# Patient Record
Sex: FEMALE
[De-identification: names, ages, dates, MRNs, and addresses within clinical notes are randomized per-mention and may not be internally consistent; named-entity substitution may affect disease eponyms.]

---

## 2019-04-02 PROBLEM — Z00.00 ENCOUNTER FOR PREVENTIVE HEALTH EXAMINATION: Status: ACTIVE | Noted: 2019-04-02

## 2019-04-30 ENCOUNTER — APPOINTMENT (OUTPATIENT)
Dept: HEPATOLOGY | Facility: CLINIC | Age: 44
End: 2019-04-30
Payer: COMMERCIAL

## 2019-04-30 VITALS
SYSTOLIC BLOOD PRESSURE: 136 MMHG | BODY MASS INDEX: 37.67 KG/M2 | DIASTOLIC BLOOD PRESSURE: 89 MMHG | HEIGHT: 67 IN | HEART RATE: 78 BPM | TEMPERATURE: 98.4 F | WEIGHT: 240 LBS

## 2019-04-30 DIAGNOSIS — Z78.9 OTHER SPECIFIED HEALTH STATUS: ICD-10-CM

## 2019-04-30 PROCEDURE — 99204 OFFICE O/P NEW MOD 45 MIN: CPT

## 2019-04-30 RX ORDER — PNV NO.95/FERROUS FUM/FOLIC AC 28MG-0.8MG
TABLET ORAL
Refills: 0 | Status: ACTIVE | COMMUNITY

## 2019-04-30 RX ORDER — MULTIVITAMIN
TABLET ORAL
Refills: 0 | Status: ACTIVE | COMMUNITY

## 2019-04-30 NOTE — PHYSICAL EXAM
[General Appearance - Alert] : alert [General Appearance - In No Acute Distress] : in no acute distress [Sclera] : the sclera and conjunctiva were normal [PERRL With Normal Accommodation] : pupils were equal in size, round, and reactive to light [Extraocular Movements] : extraocular movements were intact [Outer Ear] : the ears and nose were normal in appearance [Oropharynx] : the oropharynx was normal [Neck Appearance] : the appearance of the neck was normal [Neck Cervical Mass (___cm)] : no neck mass was observed [Jugular Venous Distention Increased] : there was no jugular-venous distention [Thyroid Diffuse Enlargement] : the thyroid was not enlarged [Thyroid Nodule] : there were no palpable thyroid nodules [Auscultation Breath Sounds / Voice Sounds] : lungs were clear to auscultation bilaterally [Heart Rate And Rhythm] : heart rate was normal and rhythm regular [Heart Sounds] : normal S1 and S2 [Heart Sounds Gallop] : no gallops [Murmurs] : no murmurs [Heart Sounds Pericardial Friction Rub] : no pericardial rub [Full Pulse] : the pedal pulses are present [Edema] : there was no peripheral edema [Breast Appearance] : normal in appearance [Breast Palpation Mass] : no palpable masses [Bowel Sounds] : normal bowel sounds [Abdomen Soft] : soft [Abdomen Tenderness] : non-tender [Abdomen Mass (___ Cm)] : no abdominal mass palpated [Urinary Bladder Findings] : the bladder was normal on palpation [Cervical Lymph Nodes Enlarged Posterior Bilaterally] : posterior cervical [Cervical Lymph Nodes Enlarged Anterior Bilaterally] : anterior cervical [Supraclavicular Lymph Nodes Enlarged Bilaterally] : supraclavicular [Axillary Lymph Nodes Enlarged Bilaterally] : axillary [Femoral Lymph Nodes Enlarged Bilaterally] : femoral [Inguinal Lymph Nodes Enlarged Bilaterally] : inguinal [No CVA Tenderness] : no ~M costovertebral angle tenderness [No Spinal Tenderness] : no spinal tenderness [Abnormal Walk] : normal gait [Nail Clubbing] : no clubbing  or cyanosis of the fingernails [Musculoskeletal - Swelling] : no joint swelling seen [Motor Tone] : muscle strength and tone were normal [Skin Color & Pigmentation] : normal skin color and pigmentation [Skin Turgor] : normal skin turgor [] : no rash [Oriented To Time, Place, And Person] : oriented to person, place, and time [Impaired Insight] : insight and judgment were intact [Affect] : the affect was normal [Spider Angioma] : No spider angioma(s) were observed [Hepatojugular Reflux] : patient did not have a sustained hepatojugular reflux [Ascites Shifting Dullness] : no shifting dullness of ascites [Abdominal  Ascites] : no ascites [Liver Palpable ___ Finger Breadths Below Costal Margin] : was not palpable below costal margin [Palmar Erythema] : no Palmar Erythema [Asterixis] : no asterixis observed [Jaundice] : No jaundice [Depression] : no depression

## 2019-04-30 NOTE — HISTORY OF PRESENT ILLNESS
[Fatigue] : denies fatigue [Malaise] : denies malaise [Fever] : denies fever [Diffuse Joint Pain (Arthralgias)] : arthralgias stable [Muscle Aches, Generalized (Myalgias)] : denies myalgias [Yellow Skin Or Eyes (Jaundice)] : denies jaundice [Abdominal Pain] : denies abdominal pain [Urine Tests Nonspecific Abnormal Findings Biliuria] : denies dark urine [Light Colored Bowel Movement (Acholic Stools)] : denies pale stools [Insomnia] : denies insomnia [Skin: Rash] : denies rash [Itching (Pruritus)] : denies pruritus [Shortness Of Breath] : denies shortness of breath [Wt Gain ___ Lbs] : recent [unfilled] ~Upound(s) weight gain [Needlestick Exposure] : no needlestick exposure [Infected Sexual Partner] : no infected sexual partner [IV Drug Use] : no IV drug use [Hemodialysis] : no hemodialysis [Body Piercing] : no body piercing [Tattoo] : no tattoos [Transplant before 1992] : no transplant before 1992 [Transfusion before 1992] : no transfusion before 1992 [Incarceration] : no incarceration [Household Contact to HBV] : no household contact to HBV [Alcohol Abuse] : no alcohol abuse [Autoimmune Disorder] : no autoimmune disorder [Travel to Endemic Area] : no travel to an endemic area [Cocaine Use] : no cocaine use [Occupational Exposure] : no occupational exposure [Wt Loss ___ Lbs] : no recent weight loss [de-identified] : Ms. Ranadll Rodriguez is a 44 year old woman with several hepatic hemangiomas seen on MRI 12/3/18 up to 4.6 cm, and five small lesions suggestive of hemangiomas, but too small to characterize fully. \par A CT from 7/23/18 mentioned in the MRI report showed liver lesions, but the report is not available.\par Weight: 240 lbs, BMI 37.6, gained 15 lbs in 4-5 months until 4/2019. Alcohol: occasionally, never heavy. [de-identified] : romain

## 2019-04-30 NOTE — ASSESSMENT
[FreeTextEntry1] : Ms. Randall Rodriguez is a 44 year old woman with hepatic hemangiomas up to 4.6 cm seen on MRI 12/2018 (scanned), and 5 lesions too small to characterize. Obese with BMI 31, but MRI not suggestive of NAFLD.\par \par I explained that hemangiomas are benign liver tumors and that the other ones are likely hemangiomas as well. The MRI report does not mention growth between the CT in 7/2018 and 12/2019, and she has no known risk factors for chronic liver disease other than obesity. Hemangiomas can grow and cause abdominal dyscomfort or pain, particluarly if they are > 5 cm, but do not need follow-up if they are smaller than that and asymptomatic.\par \par Plan:\par - LFTs, hepatitis markers, AFP\par - repeat MRI after 6 months, i.e. in June to re-evaluate the small liver lesions\par - return in 2 months, after the MRI

## 2019-05-01 LAB
AFP-TM SERPL-MCNC: 1.9 NG/ML
ALBUMIN SERPL ELPH-MCNC: 3.8 G/DL
ALP BLD-CCNC: 91 U/L
ALT SERPL-CCNC: 10 U/L
ANION GAP SERPL CALC-SCNC: 13 MMOL/L
AST SERPL-CCNC: 13 U/L
BASOPHILS # BLD AUTO: 0.04 K/UL
BASOPHILS NFR BLD AUTO: 0.8 %
BILIRUB SERPL-MCNC: <0.2 MG/DL
BUN SERPL-MCNC: 10 MG/DL
CALCIUM SERPL-MCNC: 9.1 MG/DL
CHLORIDE SERPL-SCNC: 107 MMOL/L
CHOLEST SERPL-MCNC: 181 MG/DL
CHOLEST/HDLC SERPL: 2.8 RATIO
CO2 SERPL-SCNC: 25 MMOL/L
CREAT SERPL-MCNC: 0.81 MG/DL
EOSINOPHIL # BLD AUTO: 0.12 K/UL
EOSINOPHIL NFR BLD AUTO: 2.3 %
GLUCOSE SERPL-MCNC: 98 MG/DL
HBV CORE IGG+IGM SER QL: NONREACTIVE
HBV SURFACE AB SER QL: NONREACTIVE
HBV SURFACE AG SER QL: NONREACTIVE
HCT VFR BLD CALC: 36 %
HCV AB SER QL: NONREACTIVE
HCV S/CO RATIO: 0.06 S/CO
HDLC SERPL-MCNC: 64 MG/DL
HEPATITIS A IGG ANTIBODY: NONREACTIVE
HGB BLD-MCNC: 11.4 G/DL
IMM GRANULOCYTES NFR BLD AUTO: 0.2 %
LDLC SERPL CALC-MCNC: 99 MG/DL
LYMPHOCYTES # BLD AUTO: 1.9 K/UL
LYMPHOCYTES NFR BLD AUTO: 36.9 %
MAN DIFF?: NORMAL
MCHC RBC-ENTMCNC: 28.9 PG
MCHC RBC-ENTMCNC: 31.7 GM/DL
MCV RBC AUTO: 91.1 FL
MONOCYTES # BLD AUTO: 0.36 K/UL
MONOCYTES NFR BLD AUTO: 7 %
NEUTROPHILS # BLD AUTO: 2.72 K/UL
NEUTROPHILS NFR BLD AUTO: 52.8 %
PLATELET # BLD AUTO: 404 K/UL
POTASSIUM SERPL-SCNC: 4 MMOL/L
PROT SERPL-MCNC: 6.7 G/DL
RBC # BLD: 3.95 M/UL
RBC # FLD: 12.2 %
SODIUM SERPL-SCNC: 145 MMOL/L
TRIGL SERPL-MCNC: 89 MG/DL
WBC # FLD AUTO: 5.15 K/UL

## 2019-05-24 ENCOUNTER — EMERGENCY (EMERGENCY)
Facility: HOSPITAL | Age: 44
LOS: 0 days | Discharge: HOME | End: 2019-05-24
Attending: EMERGENCY MEDICINE | Admitting: SURGERY
Payer: COMMERCIAL

## 2019-05-24 VITALS
RESPIRATION RATE: 16 BRPM | TEMPERATURE: 98 F | WEIGHT: 229.06 LBS | HEIGHT: 67 IN | OXYGEN SATURATION: 100 % | HEART RATE: 78 BPM | DIASTOLIC BLOOD PRESSURE: 76 MMHG | SYSTOLIC BLOOD PRESSURE: 117 MMHG

## 2019-05-24 VITALS
SYSTOLIC BLOOD PRESSURE: 133 MMHG | TEMPERATURE: 98 F | RESPIRATION RATE: 17 BRPM | DIASTOLIC BLOOD PRESSURE: 94 MMHG | OXYGEN SATURATION: 99 % | HEART RATE: 70 BPM

## 2019-05-24 DIAGNOSIS — K29.70 GASTRITIS, UNSPECIFIED, WITHOUT BLEEDING: ICD-10-CM

## 2019-05-24 DIAGNOSIS — K80.20 CALCULUS OF GALLBLADDER WITHOUT CHOLECYSTITIS WITHOUT OBSTRUCTION: ICD-10-CM

## 2019-05-24 DIAGNOSIS — D18.00 HEMANGIOMA UNSPECIFIED SITE: ICD-10-CM

## 2019-05-24 DIAGNOSIS — R10.13 EPIGASTRIC PAIN: ICD-10-CM

## 2019-05-24 DIAGNOSIS — R11.0 NAUSEA: ICD-10-CM

## 2019-05-24 DIAGNOSIS — K80.50 CALCULUS OF BILE DUCT WITHOUT CHOLANGITIS OR CHOLECYSTITIS WITHOUT OBSTRUCTION: ICD-10-CM

## 2019-05-24 DIAGNOSIS — D18.03 HEMANGIOMA OF INTRA-ABDOMINAL STRUCTURES: ICD-10-CM

## 2019-05-24 LAB
ALBUMIN SERPL ELPH-MCNC: 3.2 G/DL — LOW (ref 3.3–5)
ALP SERPL-CCNC: 71 U/L — SIGNIFICANT CHANGE UP (ref 40–120)
ALT FLD-CCNC: 14 U/L — SIGNIFICANT CHANGE UP (ref 12–78)
ANION GAP SERPL CALC-SCNC: 7 MMOL/L — SIGNIFICANT CHANGE UP (ref 5–17)
AST SERPL-CCNC: 11 U/L — LOW (ref 15–37)
BASOPHILS # BLD AUTO: 0.05 K/UL — SIGNIFICANT CHANGE UP (ref 0–0.2)
BASOPHILS NFR BLD AUTO: 1.3 % — SIGNIFICANT CHANGE UP (ref 0–2)
BILIRUB DIRECT SERPL-MCNC: 0.09 MG/DL — SIGNIFICANT CHANGE UP (ref 0.05–0.2)
BILIRUB SERPL-MCNC: 0.4 MG/DL — SIGNIFICANT CHANGE UP (ref 0.2–1.2)
BUN SERPL-MCNC: 8 MG/DL — SIGNIFICANT CHANGE UP (ref 7–23)
CALCIUM SERPL-MCNC: 8.9 MG/DL — SIGNIFICANT CHANGE UP (ref 8.5–10.1)
CHLORIDE SERPL-SCNC: 108 MMOL/L — SIGNIFICANT CHANGE UP (ref 96–108)
CO2 SERPL-SCNC: 26 MMOL/L — SIGNIFICANT CHANGE UP (ref 22–31)
CREAT SERPL-MCNC: 0.75 MG/DL — SIGNIFICANT CHANGE UP (ref 0.5–1.3)
EOSINOPHIL # BLD AUTO: 0.14 K/UL — SIGNIFICANT CHANGE UP (ref 0–0.5)
EOSINOPHIL NFR BLD AUTO: 3.5 % — SIGNIFICANT CHANGE UP (ref 0–6)
GLUCOSE SERPL-MCNC: 88 MG/DL — SIGNIFICANT CHANGE UP (ref 70–99)
HCG SERPL-ACNC: <1 MIU/ML — SIGNIFICANT CHANGE UP
HCT VFR BLD CALC: 34.6 % — SIGNIFICANT CHANGE UP (ref 34.5–45)
HGB BLD-MCNC: 11.8 G/DL — SIGNIFICANT CHANGE UP (ref 11.5–15.5)
IMM GRANULOCYTES NFR BLD AUTO: 0.3 % — SIGNIFICANT CHANGE UP (ref 0–1.5)
LACTATE SERPL-SCNC: 0.6 MMOL/L — LOW (ref 0.7–2)
LIDOCAIN IGE QN: 78 U/L — SIGNIFICANT CHANGE UP (ref 73–393)
LYMPHOCYTES # BLD AUTO: 1.66 K/UL — SIGNIFICANT CHANGE UP (ref 1–3.3)
LYMPHOCYTES # BLD AUTO: 41.6 % — SIGNIFICANT CHANGE UP (ref 13–44)
MCHC RBC-ENTMCNC: 29.6 PG — SIGNIFICANT CHANGE UP (ref 27–34)
MCHC RBC-ENTMCNC: 34.1 GM/DL — SIGNIFICANT CHANGE UP (ref 32–36)
MCV RBC AUTO: 86.9 FL — SIGNIFICANT CHANGE UP (ref 80–100)
MONOCYTES # BLD AUTO: 0.42 K/UL — SIGNIFICANT CHANGE UP (ref 0–0.9)
MONOCYTES NFR BLD AUTO: 10.5 % — SIGNIFICANT CHANGE UP (ref 2–14)
NEUTROPHILS # BLD AUTO: 1.71 K/UL — LOW (ref 1.8–7.4)
NEUTROPHILS NFR BLD AUTO: 42.8 % — LOW (ref 43–77)
NRBC # BLD: 0 /100 WBCS — SIGNIFICANT CHANGE UP (ref 0–0)
PLATELET # BLD AUTO: 374 K/UL — SIGNIFICANT CHANGE UP (ref 150–400)
POTASSIUM SERPL-MCNC: 3.5 MMOL/L — SIGNIFICANT CHANGE UP (ref 3.5–5.3)
POTASSIUM SERPL-SCNC: 3.5 MMOL/L — SIGNIFICANT CHANGE UP (ref 3.5–5.3)
PROT SERPL-MCNC: 7.4 GM/DL — SIGNIFICANT CHANGE UP (ref 6–8.3)
RBC # BLD: 3.98 M/UL — SIGNIFICANT CHANGE UP (ref 3.8–5.2)
RBC # FLD: 12.1 % — SIGNIFICANT CHANGE UP (ref 10.3–14.5)
SODIUM SERPL-SCNC: 141 MMOL/L — SIGNIFICANT CHANGE UP (ref 135–145)
WBC # BLD: 3.99 K/UL — SIGNIFICANT CHANGE UP (ref 3.8–10.5)
WBC # FLD AUTO: 3.99 K/UL — SIGNIFICANT CHANGE UP (ref 3.8–10.5)

## 2019-05-24 PROCEDURE — 74177 CT ABD & PELVIS W/CONTRAST: CPT | Mod: 26

## 2019-05-24 PROCEDURE — 76700 US EXAM ABDOM COMPLETE: CPT | Mod: 26

## 2019-05-24 PROCEDURE — 99285 EMERGENCY DEPT VISIT HI MDM: CPT | Mod: 25

## 2019-05-24 PROCEDURE — 93010 ELECTROCARDIOGRAM REPORT: CPT

## 2019-05-24 RX ORDER — ONDANSETRON 8 MG/1
4 TABLET, FILM COATED ORAL ONCE
Refills: 0 | Status: COMPLETED | OUTPATIENT
Start: 2019-05-24 | End: 2019-05-24

## 2019-05-24 RX ORDER — SODIUM CHLORIDE 9 MG/ML
1000 INJECTION INTRAMUSCULAR; INTRAVENOUS; SUBCUTANEOUS ONCE
Refills: 0 | Status: COMPLETED | OUTPATIENT
Start: 2019-05-24 | End: 2019-05-24

## 2019-05-24 RX ORDER — MORPHINE SULFATE 50 MG/1
2 CAPSULE, EXTENDED RELEASE ORAL ONCE
Refills: 0 | Status: DISCONTINUED | OUTPATIENT
Start: 2019-05-24 | End: 2019-05-24

## 2019-05-24 RX ORDER — PANTOPRAZOLE SODIUM 20 MG/1
40 TABLET, DELAYED RELEASE ORAL ONCE
Refills: 0 | Status: COMPLETED | OUTPATIENT
Start: 2019-05-24 | End: 2019-05-24

## 2019-05-24 RX ADMIN — ONDANSETRON 4 MILLIGRAM(S): 8 TABLET, FILM COATED ORAL at 06:45

## 2019-05-24 RX ADMIN — MORPHINE SULFATE 2 MILLIGRAM(S): 50 CAPSULE, EXTENDED RELEASE ORAL at 06:44

## 2019-05-24 RX ADMIN — SODIUM CHLORIDE 1000 MILLILITER(S): 9 INJECTION INTRAMUSCULAR; INTRAVENOUS; SUBCUTANEOUS at 06:45

## 2019-05-24 RX ADMIN — PANTOPRAZOLE SODIUM 40 MILLIGRAM(S): 20 TABLET, DELAYED RELEASE ORAL at 06:45

## 2019-05-24 NOTE — ED ADULT NURSE NOTE - CHPI ED NUR SYMPTOMS NEG
no burning urination/no chills/no dysuria/no abdominal distension/no nausea/no diarrhea/no fever/no blood in stool/no hematuria

## 2019-05-24 NOTE — ED PROVIDER NOTE - CARE PLAN
Principal Discharge DX:	Symptomatic cholelithiasis  Secondary Diagnosis:	Biliary colic Principal Discharge DX:	Symptomatic cholelithiasis  Goal:	follow up with your Gi Doctor, dr Tacho Vincent in 1 week  Assessment and plan of treatment:	OP management by Dr Keith Rodriguez and GI specialist Dr Tacho Vincent  Secondary Diagnosis:	Biliary colic

## 2019-05-24 NOTE — ED PROVIDER NOTE - PHYSICAL EXAMINATION
Gen: Alert, c/o pain  Head: NC, AT, EOMI, normal lids/conjunctiva  ENT: normal hearing, patent oropharynx, FROM  Neck: supple, no tenderness/meningismus, Trachea midline  Pulm: Bilateral clear BS, normal resp effort, no wheeze/stridor/retractions  CV: RRR, no M/R/G, +dist pulses  Abd: soft, +epigastric TTP, ND, +BS, no guarding/rebound tenderness  Mskel: no edema/erythema/cyanosis  Skin: no rash  Neuro: no sensory/motor deficits Gen: Alert, c/o pain  Head: NC, AT, EOMI, normal lids/conjunctiva  ENT: normal hearing, patent oropharynx, FROM  Neck: supple, no tenderness/meningismus, Trachea midline  Pulm: Bilateral clear BS, normal resp effort, no wheeze/stridor/retractions  CV: RRR, no M/R/G, +dist pulses  Abd: soft, +RUQ & epigastric TTP, ND, +BS, no guarding/rebound tenderness  Mskel: no edema/erythema/cyanosis  Skin: no rash  Neuro: no sensory/motor deficits

## 2019-05-24 NOTE — CONSULT NOTE ADULT - ASSESSMENT
Impression:  cholelithiasis - doubt acute cholecystitis  hemangiomas of the liver    Plan:  Pt was discussed with Dr. Kwon, via phone.  He said findings of hemangiomas was not relayed to him.   He wants the pt to be discharged from ED on pain meds & Reglan.  She was advised to f/u with Dr. Carr for her f/u CT. ASAP.  She will call Dr. Salinas's office for further evaluation & elective cholecystectomy in 10-14 days.    Pt was also discussed wit Dr. Pablo, ED attending.    Discussed with Pt. She is agreeable with this plan.  She was advised to return to ED if symptoms worsen.

## 2019-05-24 NOTE — ED PROVIDER NOTE - PLAN OF CARE
follow up with your Gi Doctor, dr Tacho Vincent in 1 week OP management by Dr Keith Rodriguez and GI specialist Dr Tacho Ray

## 2019-05-24 NOTE — ED PROVIDER NOTE - OBJECTIVE STATEMENT
Pertinent PMH/PSH/FHx/SHx and Review of Systems contained within:    43yo F w PMH of gallstones & gastritis presents to ED c/o epigastric pain w nausea x2d.  Denies fever, chills, CP, SOB, cough, back pain, known sick contacts, recent travel, ingestion of spoiled foods.    No fever/chills, No photophobia/eye pain/changes in vision, No ear pain/sore throat/dysphagia, No chest pain/palpitations, no SOB/cough/wheeze/stridor, +abdominal pain, No neck/back pain, no rash, no changes in neurological status/function. Pertinent PMH/PSH/FHx/SHx and Review of Systems contained within:    45yo F w PMH of gallstones & gastritis presents to ED c/o epigastric pain w nausea x2d.  Pt states pain worse after eating.  Denies fever, chills, CP, SOB, cough, back pain, known sick contacts, recent travel, ingestion of spoiled foods.    No fever/chills, No photophobia/eye pain/changes in vision, No ear pain/sore throat/dysphagia, No chest pain/palpitations, no SOB/cough/wheeze/stridor, +abdominal pain, No neck/back pain, no rash, no changes in neurological status/function. Pertinent PMH/PSH/FHx/SHx and Review of Systems contained within:    45yo F w PMH of gallstones & gastritis presents to ED c/o epigastric pain w nausea x2d.  Pt states pain worse after eating.  Denies fever, chills, CP, SOB, cough, back pain, known sick contacts, recent travel, ingestion of spoiled foods.. has hx of hemangioma of the liver.     No fever/chills, No photophobia/eye pain/changes in vision, No ear pain/sore throat/dysphagia, No chest pain/palpitations, no SOB/cough/wheeze/stridor, +abdominal pain, No neck/back pain, no rash, no changes in neurological status/function.  PMD Dr Frank Rodriguez, GI Dr Tacho Garcia

## 2019-05-24 NOTE — ED PROVIDER NOTE - CLINICAL SUMMARY MEDICAL DECISION MAKING FREE TEXT BOX
Pt w above dx, no GB wall thickening, however pt unable to tolerate PO intake, requesting if GB can be removed.  Consulted surgery, pt admitted for further eval/mgmt.

## 2019-05-24 NOTE — CONSULT NOTE ADULT - SUBJECTIVE AND OBJECTIVE BOX
45yo F presented to the ED earlier this AM c/o RUQ pain, nausea; no vomiting. Pain at time of presenting to ED was 10/10. Now 7-8/10.  Pain started 2 days ago. She could not "get comfortable" because of the pain & came to ED.    Pt has a known h/o gall stones. She also has a known h/o "hemangiomas "of the liver. She has been followed by Dr. Tacho Meza at [facility]. She was due for a 6 month f/u CT of liver.    Vital Signs Last 24 Hrs  T(C): 36.8 (24 May 2019 08:15), Max: 36.8 (24 May 2019 08:15)  T(F): 98.2 (24 May 2019 08:15), Max: 98.2 (24 May 2019 08:15)  HR: 70 (24 May 2019 08:15) (70 - 78)  BP: 133/94 (24 May 2019 08:15) (117/76 - 133/94)  RR: 17 (24 May 2019 08:15) (16 - 17)  SpO2: 99% (24 May 2019 08:15) (99% - 100%)    Past med & surgery  hx; denied. Allergies: denied  ROS: denies anything other than CC.                          11.8   3.99  )-----------( 374      ( 24 May 2019 05:00 )             34.6       05-24    141  |  108  |  8   ----------------------------<  88  3.5   |  26  |  0.75    Ca    8.9      24 May 2019 05:00    TPro  7.4  /  Alb  3.2<L>  /  TBili  0.4  /  DBili  .09  /  AST  11<L>  /  ALT  14  /  AlkPhos  71  05-24    General: awake , alert, orientated X 3. In NAD. Resting comfortably now  Abd; obese, soft, no guarding; mild tenderness RUQ; no palpable masses; small non-incarcerated umbilical hernia; Bowel sounds present.    < from: US Abdomen Complete (05.24.19 @ 05:39) >  IMPRESSION:       1. Cholelithiasis without sonographic evidence of acute cholecystitis.  2. Numerous hepatic lesions measuring up to 1.5 cm with sonographic   features suggestive of hemangiomas. MRI could be obtained for further   evaluation.    < from: CT Abdomen and Pelvis w/ IV Cont (05.24.19 @ 06:26) >  IMPRESSION:  4.8 cm lesion in the left hepatic lobe. 1.2 cm lesion in the left hepatic   lobe. These may represent hemangiomas, but are incompletely characterized   on this study. Additional subcentimeter hypodensity in the right hepatic   lobe. Contrast-enhanced MRI may be performed for further evaluation, as   indicated.    Cholelithiasis noted on abdominal ultrasound performed earlier the same   date is not radiodense.    No acute CT findings in the abdomen or pelvis.

## 2019-05-24 NOTE — ED ADULT TRIAGE NOTE - CHIEF COMPLAINT QUOTE
pt c/o upper abd pain +nausea, (worse when she lies down), denies V/D since Wednesday.  LMP 5/2/19.  pt took federgel for acid reflux at 3:30am

## 2019-06-10 PROBLEM — K80.20 CALCULUS OF GALLBLADDER WITHOUT CHOLECYSTITIS WITHOUT OBSTRUCTION: Chronic | Status: ACTIVE | Noted: 2019-05-24

## 2019-06-10 PROBLEM — D18.03 HEMANGIOMA OF INTRA-ABDOMINAL STRUCTURES: Chronic | Status: ACTIVE | Noted: 2019-05-24

## 2019-06-15 ENCOUNTER — FORM ENCOUNTER (OUTPATIENT)
Age: 44
End: 2019-06-15

## 2019-06-16 ENCOUNTER — OUTPATIENT (OUTPATIENT)
Dept: OUTPATIENT SERVICES | Facility: HOSPITAL | Age: 44
LOS: 1 days | End: 2019-06-16
Payer: COMMERCIAL

## 2019-06-16 ENCOUNTER — APPOINTMENT (OUTPATIENT)
Dept: MRI IMAGING | Facility: CLINIC | Age: 44
End: 2019-06-16

## 2019-06-16 DIAGNOSIS — D18.03 HEMANGIOMA OF INTRA-ABDOMINAL STRUCTURES: ICD-10-CM

## 2019-06-16 DIAGNOSIS — K76.9 LIVER DISEASE, UNSPECIFIED: ICD-10-CM

## 2019-06-16 DIAGNOSIS — Z00.8 ENCOUNTER FOR OTHER GENERAL EXAMINATION: ICD-10-CM

## 2019-06-16 PROCEDURE — 74183 MRI ABD W/O CNTR FLWD CNTR: CPT

## 2019-06-16 PROCEDURE — A9585: CPT

## 2019-06-16 PROCEDURE — 74183 MRI ABD W/O CNTR FLWD CNTR: CPT | Mod: 26

## 2019-07-01 ENCOUNTER — APPOINTMENT (OUTPATIENT)
Dept: HEPATOLOGY | Facility: CLINIC | Age: 44
End: 2019-07-01
Payer: COMMERCIAL

## 2019-07-01 VITALS
HEART RATE: 74 BPM | WEIGHT: 218 LBS | TEMPERATURE: 98.7 F | DIASTOLIC BLOOD PRESSURE: 81 MMHG | RESPIRATION RATE: 15 BRPM | SYSTOLIC BLOOD PRESSURE: 143 MMHG | BODY MASS INDEX: 34.21 KG/M2 | HEIGHT: 67 IN

## 2019-07-01 DIAGNOSIS — D18.03 HEMANGIOMA OF INTRA-ABDOMINAL STRUCTURES: ICD-10-CM

## 2019-07-01 DIAGNOSIS — K80.20 CALCULUS OF GALLBLADDER W/OUT CHOLECYSTITIS W/OUT OBSTRUCTION: ICD-10-CM

## 2019-07-01 DIAGNOSIS — K21.9 GASTRO-ESOPHAGEAL REFLUX DISEASE W/OUT ESOPHAGITIS: ICD-10-CM

## 2019-07-01 DIAGNOSIS — E66.9 OBESITY, UNSPECIFIED: ICD-10-CM

## 2019-07-01 DIAGNOSIS — R10.13 EPIGASTRIC PAIN: ICD-10-CM

## 2019-07-01 PROCEDURE — 99214 OFFICE O/P EST MOD 30 MIN: CPT

## 2019-07-01 NOTE — ASSESSMENT
[FreeTextEntry1] : Ms. Randall Rodriguez is a 44 year old woman with hepatic hemangiomas up to 4.6 cm seen on MRI 12/2018 (scanned), and 5 lesions too small to characterize. Obese with BMI 31, but MRI not suggestive of NAFLD. LFTs normal in April 2019.\par - several hepatic hemangiomas up to 4.6 cm. No growth between 12/2018 and 6/2019 (MRIs).\par - RUQ pain in May 2019 with symptoms typical of cholelithiasis. Gallstones seen on imaging.\par \par I explained that hemangiomas can grow and cause abdominal dyscomfort or pain, particluarly if they are > 5 cm, but do not need follow-up if they are smaller than that and asymptomatic. Pain is typically constant or provoked by leaning forward or lying on the right side.\par \par - no follow-up required for her hemangiomas, unless she develops RUQ pain after cholecystectomy\par - I recommend to proceed with cholecystectomy, without removal of hemangiomas.\par \par \par

## 2019-07-01 NOTE — HISTORY OF PRESENT ILLNESS
[Fatigue] : denies fatigue [Malaise] : denies malaise [Fever] : denies fever [Diffuse Joint Pain (Arthralgias)] : arthralgias stable [Muscle Aches, Generalized (Myalgias)] : denies myalgias [Yellow Skin Or Eyes (Jaundice)] : denies jaundice [Abdominal Pain] : denies abdominal pain [Urine Tests Nonspecific Abnormal Findings Biliuria] : denies dark urine [Light Colored Bowel Movement (Acholic Stools)] : denies pale stools [Insomnia] : denies insomnia [Skin: Rash] : denies rash [Itching (Pruritus)] : denies pruritus [Shortness Of Breath] : denies shortness of breath [Wt Gain ___ Lbs] : recent [unfilled] ~Upound(s) weight gain [Needlestick Exposure] : no needlestick exposure [Infected Sexual Partner] : no infected sexual partner [IV Drug Use] : no IV drug use [Tattoo] : no tattoos [Body Piercing] : no body piercing [Hemodialysis] : no hemodialysis [Transfusion before 1992] : no transfusion before 1992 [Transplant before 1992] : no transplant before 1992 [Incarceration] : no incarceration [Alcohol Abuse] : no alcohol abuse [Autoimmune Disorder] : no autoimmune disorder [Household Contact to HBV] : no household contact to HBV [Travel to Endemic Area] : no travel to an endemic area [Occupational Exposure] : no occupational exposure [Cocaine Use] : no cocaine use [Wt Loss ___ Lbs] : no recent weight loss [de-identified] : Ms. Randall Rodriguez is a 44 year old woman with several hepatic hemangiomas seen on MRI 12/3/18 up to 4.6 cm, and five small lesions suggestive of hemangiomas, but too small to characterize fully. \par A CT from 7/23/18 mentioned in the MRI report showed liver lesions, but the report is not available.\par Weight: 240 lbs, BMI 37.6, gained 15 lbs in 4-5 months until 4/2019. Alcohol: occasionally, never heavy.\par \par - 7/1/19: had RUQ pain in May and went to the ED. CT showed cholelithiasis. Pain came in waves, resolved after mophine. Saw a surgeon - question was whether to remove liver lesion at same time as gallbladder removal. Occas. mild RUQ/epigastric dyscomfort since then, depending on diet.\par Workup:\par - 6/16/19 MRI: scattered hepatic hemangiomas, largest 4.6 cm in left lobe. No suspicious hepatic lesion. Cholelithiasis.\par - 12/03/18 MRI (scanned, images uploaded): left lobe 4.6 cm and 1.0 cm hemangiomas (concordant). Right lobe 1.2 cm hemangioma (concordant) and small lesions highly suspicious but not diagnostic of hemangiomas. Suggest f/u in 6 months. Lesion sizes on CT 5/2019 in left lobe correspond. The CT did not describe the 1.2 cm lesion in the right lobe, only subcentimeter lesions.  [de-identified] : romain

## 2020-02-10 NOTE — ED ADULT TRIAGE NOTE - BP NONINVASIVE DIASTOLIC (MM HG)
female    RADIATION ONCOLOGY CONSULTATION NOTE      January  3, 2017    Patient Name: ROB BERKOWITZ  YOB: 1941  MRN: [id number]  Account Number: [id number]  Referring Physician: Brandyn De Leon MD  Dictating Physician: Emmanuel Lynn M.D.    Diagnosis:   Description Dx Code T N M Stage Dx Date   Malignant neoplasm of fundus uteri [ICD10] C54.3 T2 N0 M0 II  9/10/16       History of Present Illness:   75 year old referred by Dr. De Leon with diagnosis of  stage II endometrial cancer.  Seen with .  Reports having continued vaginal bleeding after menopause.  Lifelong and longstanding bleeding.  She is s/p total laproscopic hysterectomy, BSO, and washings on 12/1/16 for endometriod adenocarcinoma, grade 1, with superficial myometrial invasion, extension to lower uterine segment, and cervical stromal invasion.  Washings are positive.  She denies GI or  complaints since surgery.  Denies vaginal bleeding.    CT abdomen/pelvis 11/19/16 revealed hypodensity in vicinity of endometrial canal extending towards right suggesting endometrial cancer.  Soft tissue opacity along posterior aspect uterus could represent extension of neoplasm and/or fibrois.  No adenopathy was noted.    Past Medical/Surgical History:   History of back surgery  Essential (primary) hypertension    Medications:    Drug Name Strength Dose Route   metoprolol tartrate   50 mg Oral   valsartan   80 mg Oral   Milk of Magnesia     Oral      Allergies:    Allergy Type Name Reaction   Medication Allergy penicillin V potassium Hives     Social History:   Retired .  Lives with son and daughter.    Family History: No family history of cancer.  Please refer to scanned History form for complete Past Medical, Surgical, Social and Family History which I have reviewed.     Review of Systems: Please refer to the scanned History forms for completed ROS which I have reviewed.  Constitutional: denies fatigue and night sweats, weight  loss  HEENT: denies dysphagia and odynophagia  Cardiovascular: denies chest pain, palpitations, and syncope  Pulmonary: denies shortness of breath at rest and no SOB with stairs, cough, and wheezing  Gastrointestinal: denies vomiting, diarrhea, and constipation  Genitourinary: denies dysuria, hematuria, and incontinence  Neurological: denies headaches, numbness, weakness, and changes in vision or hearing  Psychiatric: denies any recent depression or anxiety   Musculoskeletal: history of back pain  Endocrine: denies polyuria, polydypsia and cold intolerance  Integumentary: denies rashes and pruritis  Immunologic: denies recent infections    Physical Examination:   Vital signs:  BP Pulse Height Weight Pain   183/81  84  62.4 in (158.5 cm)  230.38 lb  (104.5 kg) 0    ECOG Performance Status: 1     General: Well-appearing female.  HEENT: Sclera anicteric..  Lungs: Clear to ausculation  Heart: RRR   Abdomen: Soft without organomegaly.   Gyne: Normal external genetalia on inspection. On speculum exam, cuff appears to be healing with sutures noted.  Small amount of blood.  No suspicious masses or nodularity on manual exam.      Impression: ROB Molina is a 75 year old female with stage II endometrial carcinoma, s/p CODY/BSO.    Plan:  We had a long and detailed discussion with the patient regarding her diagnosis, recent radiologic and pathologic study results, and various therapeutic options.     I saw her with .    I discussed her care with Dr. De Leon.    She initially refused all imaging and radiation options to nursing and then to myself.    I discussed with her son by speaker phone.    I reviewed the risks, benefits, side-effects, and alternatives of adjuvant radiation therapy for hr stage II endometrial carcinoma and I recommend adjuvant radiation therapy.  Options of pelvic radiation therapy to 4500 cGy in 25 fractions combined with a vaginal cuff brachytherapy boost of 2 fractions was  reviewed and recommended.  We also reviewed options of pelvic alone or cuff alone treatment.      She agreed to have followup PET next week and then return to my clinic for followup.  She remains reluctant to have treatment and I have clearly discussed and recommended postoperative radiation to her.  She expressed understanding.    Discussed with Dr. De Leon.      Authenticated by Julia Tomlinson M.D. on January  3, 2017 at 10:19 AM  JULIA TOMLINSON M.D.    CC:   Helder Rodriguez,  (PCP)  Brandyn De Leon MD (Referring)     77